# Patient Record
Sex: FEMALE | Race: BLACK OR AFRICAN AMERICAN
[De-identification: names, ages, dates, MRNs, and addresses within clinical notes are randomized per-mention and may not be internally consistent; named-entity substitution may affect disease eponyms.]

---

## 2019-01-11 ENCOUNTER — HOSPITAL ENCOUNTER (EMERGENCY)
Dept: HOSPITAL 72 - EMR | Age: 39
Discharge: HOME | End: 2019-01-11
Payer: COMMERCIAL

## 2019-01-11 VITALS — SYSTOLIC BLOOD PRESSURE: 142 MMHG | DIASTOLIC BLOOD PRESSURE: 77 MMHG

## 2019-01-11 VITALS — HEIGHT: 62 IN | BODY MASS INDEX: 41.41 KG/M2 | WEIGHT: 225 LBS

## 2019-01-11 DIAGNOSIS — M79.10: ICD-10-CM

## 2019-01-11 DIAGNOSIS — J45.901: Primary | ICD-10-CM

## 2019-01-11 PROCEDURE — 94664 DEMO&/EVAL PT USE INHALER: CPT

## 2019-01-11 PROCEDURE — 99283 EMERGENCY DEPT VISIT LOW MDM: CPT

## 2019-01-11 PROCEDURE — 94640 AIRWAY INHALATION TREATMENT: CPT

## 2019-01-11 RX ADMIN — Medication SCH MCG: at 20:03

## 2019-01-11 RX ADMIN — LEVALBUTEROL HYDROCHLORIDE SCH MG: 1.25 SOLUTION, CONCENTRATE RESPIRATORY (INHALATION) at 20:44

## 2019-01-11 RX ADMIN — LEVALBUTEROL HYDROCHLORIDE SCH MG: 1.25 SOLUTION, CONCENTRATE RESPIRATORY (INHALATION) at 21:00

## 2019-01-11 RX ADMIN — Medication SCH MCG: at 20:04

## 2019-01-11 RX ADMIN — LEVALBUTEROL HYDROCHLORIDE SCH MG: 1.25 SOLUTION, CONCENTRATE RESPIRATORY (INHALATION) at 20:15

## 2019-01-11 RX ADMIN — LEVALBUTEROL HYDROCHLORIDE SCH MG: 1.25 SOLUTION, CONCENTRATE RESPIRATORY (INHALATION) at 20:04

## 2019-01-11 RX ADMIN — Medication SCH MCG: at 21:00

## 2019-01-11 RX ADMIN — Medication SCH MCG: at 20:46

## 2019-01-11 RX ADMIN — LEVALBUTEROL HYDROCHLORIDE SCH MG: 1.25 SOLUTION, CONCENTRATE RESPIRATORY (INHALATION) at 19:35

## 2019-01-11 RX ADMIN — LEVALBUTEROL HYDROCHLORIDE SCH MG: 1.25 SOLUTION, CONCENTRATE RESPIRATORY (INHALATION) at 20:03

## 2019-01-11 RX ADMIN — Medication SCH MCG: at 20:43

## 2019-01-11 RX ADMIN — Medication SCH MCG: at 19:35

## 2019-01-11 RX ADMIN — LEVALBUTEROL HYDROCHLORIDE SCH MG: 1.25 SOLUTION, CONCENTRATE RESPIRATORY (INHALATION) at 20:46

## 2019-01-11 RX ADMIN — Medication SCH MCG: at 20:15

## 2019-01-11 RX ADMIN — LEVALBUTEROL HYDROCHLORIDE SCH MG: 1.25 SOLUTION, CONCENTRATE RESPIRATORY (INHALATION) at 20:45

## 2019-01-11 RX ADMIN — Medication SCH MCG: at 20:42

## 2019-01-11 NOTE — EMERGENCY ROOM REPORT
History of Present Illness


General


Chief Complaint:  Asthma


Source:  Patient





Present Illness


HPI


38-year-old female presents ED for evaluation.  Patient presents with cough and 

shortness of breath.  History of asthma cough is dry.  Patient notes body 

aches.  Dull, 5 out of 10, nonradiating.  Denies fevers or chills.  Denies 

chest pain.  Denies sick contacts or recent travel.  States that she has her 

Flovent but not her albuterol inhaler.  No other aggravating relieving factors.

  Denies any other associated symptoms


Allergies:  


Coded Allergies:  


     No Known Allergies (Unverified , 1/11/19)





Patient History


Past Medical History:  asthma


Past Surgical History:  none


Pertinent Family History:  none


Social History:  Denies: smoking, alcohol use, drug use


Last Menstrual Period:  12/01/18


Pregnant Now:  No


Immunizations:  UTD


Reviewed Nursing Documentation:  PMH: Agreed; PSxH: Agreed





Nursing Documentation-PMH


Past Medical History:  No History, Except For


Hx Asthma:  Yes





Review of Systems


All Other Systems:  negative except mentioned in HPI





Physical Exam





Vital Signs








  Date Time  Temp Pulse Resp B/P (MAP) Pulse Ox O2 Delivery O2 Flow Rate FiO2


 


1/11/19 18:53 98.8 136 18 131/88 93 Room Air  


 


1/11/19 19:30        21








Sp02 EP Interpretation:  reviewed, normal


General Appearance:  no apparent distress, alert, GCS 15, non-toxic


Head:  normocephalic


Eyes:  bilateral eye normal inspection, bilateral eye PERRL


ENT:  normal ENT inspection


Neck:  normal inspection


Respiratory:  decreased breath sounds, wheezing


Cardiovascular #1:  no edema, tachycardia


Gastrointestinal:  normal inspection


Rectal:  deferred


Genitourinary:  no CVA tenderness


Musculoskeletal:  normal inspection


Neurologic:  alert, oriented x3, responsive, motor strength/tone normal, 

sensory intact, speech normal


Psychiatric:  normal inspection


Skin:  normal inspection


Lymphatic:  normal inspection





Medical Decision Making


Diagnostic Impression:  


 Primary Impression:  


 Asthma attack


 Qualified Codes:  J45.901 - Unspecified asthma with (acute) exacerbation


ER Course


Hospital Course 


38-year-old female presents to ED complaining of cough, wheezing





Differential diagnoses include: URI, bronchitis, asthma/COPD, pneumonia 





Clinical course


Patient placed on stretcher.  After initial history, physical exam reveals a 

female in no acute distress.  Bilateral TM unremarkable.  No pharyngeal 

erythema.  No tonsillar exudates.  No lymphadenopathy.  Mild wheezing noted on 

exam, no signs of respiratory distress or retractions.  Patient given Prelone 

and Xopenex/Atrovent treatment in ED with symptoms improved.





Initially tachycardic but resolved after treatment.  Reassurance given.  Safe 

for discharge.  We'll prescribe inhaler, steroids.  Patient has a PMD





Diagnosis - asthma exacerbation 





Stable and discharged home with prescriptions for albuterol, prednisone.  

Instructed to followup with PMD.  Return to ED if symptoms recur or worsen





Last Vital Signs








  Date Time  Temp Pulse Resp B/P (MAP) Pulse Ox O2 Delivery O2 Flow Rate FiO2


 


1/11/19 20:55  98 14  100 Room Air  21


 


1/11/19 19:05 98.8   142/77    








Status:  improved


Disposition:  HOME, SELF-CARE


Condition:  Stable


Scripts


Prednisone* (PREDNISONE*) 20 Mg Tablet


40 MG ORAL DAILY, #10 TAB


   Prov: Hawk Salgado MD         1/11/19 


Albuterol Sulfate* (ALBUTEROL SULFATE MDI*) 8.5 Gm Hfa.aer.ad


2 PUFF INH Q6H, #1 EA 0 Refills


   Prov: Hawk Salgado MD         1/11/19


Referrals:  


[facility]ERR[facility] (PCP)


Patient Instructions:  Asthma, Adult











Hawk Salgado MD Jan 11, 2019 21:55
caffeine

## 2019-06-10 ENCOUNTER — HOSPITAL ENCOUNTER (EMERGENCY)
Dept: HOSPITAL 72 - EMR | Age: 39
Discharge: HOME | End: 2019-06-10
Payer: COMMERCIAL

## 2019-06-10 VITALS — HEIGHT: 63 IN | WEIGHT: 222 LBS | BODY MASS INDEX: 39.34 KG/M2

## 2019-06-10 VITALS — SYSTOLIC BLOOD PRESSURE: 132 MMHG | DIASTOLIC BLOOD PRESSURE: 82 MMHG
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
## 2019-06-10 DIAGNOSIS — S93.402A: Primary | ICD-10-CM

## 2019-06-10 PROCEDURE — 99283 EMERGENCY DEPT VISIT LOW MDM: CPT

## 2019-06-10 NOTE — NUR
ER DISCHARGE NOTE:pain meds diven, x-ray done  and ace wrap placed on left 
ankle

Patient is cleared to be discharged per ERMD, pt is aox4, on room air, with 
stable vital signs. pt was given dc and prescription instructions, pt was able 
to verbalize understanding, pt is able to ambulate with steady gait. pt took 
all belongings.

## 2019-06-10 NOTE — DIAGNOSTIC IMAGING REPORT
Indication: left ankle pain

 

Comparison: None

 

Findings:

 

3 views of the left ankle obtained.

 

 

There is soft tissue swelling in the ankle and hindfoot region.  No acute fracture,

malalignment, periostitis, or osteochondral defects are identified. There is a mild

degree of mid foot periarticular spurring due to arthrosis.

 

Impression: Soft tissue swelling

## 2019-06-10 NOTE — EMERGENCY ROOM REPORT
History of Present Illness


General


Chief Complaint:  Lower Extremity Injury


Source:  Medical Record





Present Illness


HPI


38-year-old female presents to the emergency department complaining of 

localized pain, swelling, tenderness and intermittent warmth to the left ankle 

x1 week.  Patient states that walking and standing exacerbates her symptoms she 

reports acute onset after slipping while at the pool 1 week ago.  Patient 

states she is able to bear weight she denies previous injury to the extremity.  

She has been taking Motrin 600 mg without much relief.  Denies fevers, chills, 

rashes, hx of joint swelling/pain, hx or RA or autoimmune disorder, hx of STI, 

numbness tingling or loss of sensation or gross motor movements of the 

extremities, incontinence of bowel or bladder. Denies CP, Palpitations, LOC, AMS

, dizziness, Changes in Vision, weakness or a sudden severe headache.


Allergies:  


Coded Allergies:  


     No Known Allergies (Unverified , 1/11/19)





Patient History


Past Medical History:  see triage record


Past Surgical History:  none


Pertinent Family History:  none


Last Menstrual Period:  05/14/19


Reviewed Nursing Documentation:  PMH: Agreed; PSxH: Agreed





Nursing Documentation-PMH


Past Medical History:  No History, Except For


Hx Asthma:  Yes





Review of Systems


All Other Systems:  negative except mentioned in HPI





Physical Exam





Vital Signs








  Date Time  Temp Pulse Resp B/P (MAP) Pulse Ox O2 Delivery O2 Flow Rate FiO2


 


6/10/19 12:13 98.4 105 18 132/82 (99) 96 Room Air  








Sp02 EP Interpretation:  reviewed, normal


General Appearance:  no apparent distress, alert, GCS 15, non-toxic


Head:  normocephalic, atraumatic


Eyes:  bilateral eye normal inspection, bilateral eye PERRL


ENT:  hearing grossly normal, normal voice


Neck:  full range of motion


Respiratory:  chest non-tender, lungs clear, normal breath sounds, speaking 

full sentences


Cardiovascular #1:  regular rate, rhythm, no edema - left ankle, normal 

capillary refill


Musculoskeletal:  back normal, gait/station normal, normal range of motion - 

compensatory-favoring the left ankle, swelling - left medial and lateral ankle, 

no erythema or warmth. Able to bear weight. FROM , some mild ttp. , other - no 

increased laxity or obvious deformities, no redness. , tender - left ankle


Neurologic:  alert, oriented x3, responsive, motor strength/tone normal, 

sensory intact, speech normal, grossly normal


Psychiatric:  judgement/insight normal


Skin:  normal color, no rash, warm/dry, well hydrated





Medical Decision Making


PA Attestation


Dr. Patel is my supervising Physician whom patient management has been discussed 

with.


Diagnostic Impression:  


 Primary Impression:  


 Ankle pain, left


 Qualified Codes:  M25.572 - Pain in left ankle and joints of left foot


 Additional Impression:  


 Left ankle sprain


 Qualified Codes:  S93.402A - Sprain of unspecified ligament of left ankle, 

initial encounter


ER Course


38-year-old female presents to the emergency department complaining of 

localized pain, swelling, tenderness and intermittent warmth to the left ankle 

x1 week.  Patient states that walking and standing exacerbates her symptoms she 

reports acute onset after slipping while at the pool 1 week ago.  Patient 

states she is able to bear weight she denies previous injury to the extremity.  

She has been taking Motrin 600 mg without much relief.  Denies fevers, chills, 

rashes, hx of joint swelling/pain, hx or RA or autoimmune disorder, hx of STI, 

numbness tingling or loss of sensation or gross motor movements of the 

extremities, incontinence of bowel or bladder. Denies CP, Palpitations, LOC, AMS

, dizziness, Changes in Vision, weakness or a sudden severe headache. 





Ddx considered but are not limited to Fracture, dislocation, contusion,  Sprain/

Strain/Spasm, Septic Joint, RA, arthritis just to name a few  





Vital signs: are WNL, pt. is afebrile


H&PE are most consistent with musculoskeletal injury will perform imaging to r/

o fractures/dislocations. 





ORDERS:





-  X-ray Left ankle 3 views   - negative for fx, Dislocation, or significant 

soft tissue injury, per preliminary read in ED, and signed by  FELISA Rodríguez

, my supervising physician has reviewed, and agrees with my interpretation.





ED INTERVENTIONS:





-  Tylenol 


-Ace wrap applied to the left ankle by ED tech. Pt. remains neurovascularly 

intact.


-Patient is provided with crutches and instructed on their use








DISCHARGE: At this time pt. is stable for d/c to home. Will provide printed 

patient care instructions, and any necessary prescriptions. Care plan and 

follow up instructions have been discussed with the patient prior to discharge.


Other X-Ray Diagnostic Results


Other X-Ray Diagnostic Results :  


   X-Ray ordered:  Left Ankle


   # of Views/Limited Vs Complete:  3 View


   Indication:  Pain


   EP Interpretation:  Yes


   PA Xray:  Interpretation reviewed, by supervising MD, and agrees with 

findings.


   Interpretation:  no dislocation, no soft tissue swelling, no fractures


   Impression:  No acute disease


   Electronically Signed by:  Arcelia Rodríguez PA-C





Last Vital Signs








  Date Time  Temp Pulse Resp B/P (MAP) Pulse Ox O2 Delivery O2 Flow Rate FiO2


 


6/10/19 12:13 98.4 105 18 132/82 (99) 96 Room Air  








Status:  improved


Disposition:  HOME, SELF-CARE


Condition:  Stable


Scripts


Ibuprofen* (MOTRIN*) 600 Mg Tablet


600 MG ORAL THREE TIMES A DAY, #30 TAB 0 Refills


   Prov: Arcelia Rodríguez         6/10/19 


Diclofenac Sodium (VOLTAREN) 100 Gm Gel..gram.


1 APPLIC TP TID, #100 GM


   Prov: Arcelia Rodríguez         6/10/19


Departure Forms:  Return to Work      Return to Work Date:  Jun 13, 2019


   Work Restrictions:  None, No Heavy Lifting, No Prolonged Standing


   Other Restrictions:  May return Sooner if Symptoms have resolved. 


   Return to Full Activity:  Jun 17, 2019


Patient Instructions:  Ankle Sprain





Additional Instructions:  


Take medications as directed. 





 ** Follow up with an ORTHOPEDIC SPECIALIST in 3-5 days, even if your symptoms 

have resolved. ** 





If symptoms persist MRI may be required at the discretion of your PCP or Ortho 

Specialist.  ** 





--Please review list of primary care clinics, if you do not already have a 

primary care provider who can give you an Orthopedic Referral. 





Return sooner to ED if new symptoms occur, or current symptoms become worse. 


 











- Please note that this Emergency Department Report was dictated using [employer] technology software, occasionally this can lead to 

erroneous entry secondary to interpretation by the dictation equipment.











Arcelia Rodríguez Anthony 10, 2019 12:48

## 2020-03-04 ENCOUNTER — HOSPITAL ENCOUNTER (EMERGENCY)
Dept: HOSPITAL 72 - EMR | Age: 40
Discharge: HOME | End: 2020-03-04
Payer: COMMERCIAL

## 2020-03-04 VITALS
WEIGHT: 225 LBS | HEIGHT: 65 IN | SYSTOLIC BLOOD PRESSURE: 136 MMHG | BODY MASS INDEX: 37.49 KG/M2 | DIASTOLIC BLOOD PRESSURE: 100 MMHG

## 2020-03-04 VITALS — DIASTOLIC BLOOD PRESSURE: 100 MMHG | SYSTOLIC BLOOD PRESSURE: 136 MMHG

## 2020-03-04 DIAGNOSIS — J45.909: ICD-10-CM

## 2020-03-04 DIAGNOSIS — F17.200: ICD-10-CM

## 2020-03-04 DIAGNOSIS — L03.115: Primary | ICD-10-CM

## 2020-03-04 PROCEDURE — 99283 EMERGENCY DEPT VISIT LOW MDM: CPT

## 2020-03-04 PROCEDURE — 73610 X-RAY EXAM OF ANKLE: CPT

## 2020-03-04 PROCEDURE — 73630 X-RAY EXAM OF FOOT: CPT

## 2020-03-04 RX ADMIN — KETOROLAC TROMETHAMINE ONE MG: 30 INJECTION, SOLUTION INTRAMUSCULAR; INTRAVENOUS at 09:48

## 2020-03-04 RX ADMIN — KETOROLAC TROMETHAMINE ONE MG: 30 INJECTION, SOLUTION INTRAMUSCULAR; INTRAVENOUS at 09:43

## 2020-03-04 NOTE — NUR
ED Nurse Note:



before dispensing toradol, RN fully explained to the patient regarding that 
it's going to be given IM shot, patient gave verbal consent. however after 
preparing the medication, patient refused to take IM shot. 60 mg of toradol 
wasted with witness in the pyxis.

## 2020-03-04 NOTE — DIAGNOSTIC IMAGING REPORT
Indication: Foot Pain

 

Comparison: None

 

Findings: 3  views of the right foot were obtained. 

 

No acute fractures, malalignment, erosions or periostitis are identified.

 

Impression: No acute findings.

## 2020-03-04 NOTE — EMERGENCY ROOM REPORT
History of Present Illness


General


Chief Complaint:  General Complaint


Source:  Patient, EMS





Present Illness


HPI


Patient is a 39-year-old female past medical history of left ankle cellulitis 

who presents to the ER complaining of right ankle pain.  She states that it 

began several days ago at rest.  She denies any fever or chills.  She complains 

of swelling.  She denies any trauma.  She denies any rash.  She denies any 

chest pain or shortness of breath.  She denies any abdominal pain, nausea or 

vomiting.  Patient was brought in by EMS.  She states it is painful to walk.


Allergies:  


Coded Allergies:  


     No Known Allergies (Unverified , 1/11/19)





Patient History


Past Medical History:  other - cellulitis


Past Surgical History:  none


Social History:  Reports: smoking





Nursing Documentation-[facility]


Past Medical History:  No Stated History


Hx Asthma:  Yes





Review of Systems


All Other Systems:  negative except mentioned in HPI





Physical Exam





Vital Signs








  Date Time  Temp Pulse Resp B/P (MAP) Pulse Ox O2 Delivery O2 Flow Rate FiO2


 


3/4/20 09:21 99.0 102 18 136/100 (112) 98 Room Air  








Sp02 EP Interpretation:  reviewed, normal


General Appearance:  no apparent distress, alert, GCS 15, non-toxic


Head:  normocephalic, atraumatic


Eyes:  bilateral eye normal inspection, bilateral eye PERRL


ENT:  hearing grossly normal, normal pharynx, no angioedema, normal voice


Neck:  full range of motion, supple/symm/no masses


Respiratory:  chest non-tender, lungs clear, normal breath sounds, speaking 

full sentences


Cardiovascular #1:  regular rate, rhythm, no edema


Cardiovascular #2:  2+ carotid (R), 2+ carotid (L), 2+ radial (R), 2+ radial (L)

, 2+ dorsalis pedis (R), 2+ dorsalis pedis (L)


Gastrointestinal:  normal bowel sounds, non tender, soft, non-distended, no 

guarding, no rebound


Rectal:  deferred


Genitourinary:  normal inspection, no CVA tenderness


Musculoskeletal:  back normal, normal range of motion, calf tenderness, gait/

station normal, other - Diffuse right foot and ankle tenderness with swelling, 

warm to touch, no erythema, no crepitus, normal range of motion no calf 

tenderness or swelling


Neurologic:  alert, motor strength/tone normal, oriented x3, sensory intact, 

responsive, speech normal


Psychiatric:  judgement/insight normal, memory normal, mood/affect normal, no 

suicidal/homicidal ideation


Reflexes:  3+ bicep (R), 3+ bicep (L), 3+ tricep (R), 3+ tricep (L), 3+ knee (R)

, 3+ knee (L)


Skin:  no rash


Lymphatic:  no adenopathy





Medical Decision Making


Diagnostic Impression:  


 Primary Impression:  


 Cellulitis


ER Course


Patient's x-rays demonstrate no acute fractures.  Patient started on Bactrim 

and Keflex for her cellulitis.  After discussing risks and benefits of further 

diagnostics, treatment plans, as well as indications for and risks of admission

, the patient is agreeable to being discharged home.  I have explained that 

their evaluation and treatment in the emergency department today is an 

important step towards them achieving better health but that their evaluation 

today is not intended to replace further evaluation and treatment by a 

physician in their local clinic.  I have explained that while the current 

findings suggest no immediate life threatening emergency they will require 

further evaluation and treatment by a physician of their choice in their area.  

They understand that it will be necessary for them to review the final reports 

of their ED visit with their clinic physician.  We have reviewed indications 

for return to the Emergency Department.  I have explained that additional time 

may need to pass and/or additional testing as an outpatient may be necessary 

before a definitive diagnosis can be made.  They tell me they are willing to 

follow up as instructed within the timeframe I recommend.  They appear to 

understand what we discussed.  Additionally they understand that if they are 

unable to be seen by an outpatient physician they are welcome, and in fact 

should, return to the Emergency Department for a repeat evaluation.  The 

patient is stable at time of discharge.





Last Vital Signs








  Date Time  Temp Pulse Resp B/P (MAP) Pulse Ox O2 Delivery O2 Flow Rate FiO2


 


3/4/20 09:33  102 18   Room Air  


 


3/4/20 09:28 99.0   136/100 98   








Disposition:  HOME, SELF-CARE


Condition:  Stable


Scripts


Trimethoprim/Sulfamethoxazole 160/800* (BACTRIM DS TABLET*) 1 Each Tablet


1 TAB ORAL Q12H for 10 Days, #14 TAB 0 Refills


   Prov: Asiya Lawrence M.D.         3/4/20 


Cephalexin* (KEFLEX*) 500 Mg Capsule


500 MG ORAL EVERY 6 HOURS for 10 Days, CAP


   Prov: Asiya Lawrence M.D.         3/4/20











Asiya Lawrence M.D. Mar 4, 2020 09:45

## 2020-03-04 NOTE — DIAGNOSTIC IMAGING REPORT
Indication: Pain right ankle  

 

Comparison: 1/30/2020

 

Findings:

 

3 views of the right ankle obtained.

 

 No acute fracture, malalignment, periostitis, or osteochondral defects are

identified. Osteophytes noted within the tibiotalar joint and plantar calcaneal spur

 

 There is generalized soft tissue swelling present.  .

 

Impression:

 

No acute findings

## 2020-03-04 NOTE — NUR
ER DISCHARGE NOTE:

Patient is cleared to be discharged per ERMD DR LAWRENCE, pt is aox4, on room 
air, with stable vital signs. pt was given dc and prescription instructions, pt 
was able to verbalize understanding, pt id band removed without complications. 
pt is able to ambulate with steady gait. pt took all belongings. boot provided 
to the patient as ordered by Dr. Lawrence by [facility].

## 2020-03-04 NOTE — NUR
ED Nurse Note:



patient brought into ED from home by ambulance  due to right ankle pain 
that got worse since 3/3/20, patient reports possible cellulitis and was seen 
by a doctor and got treatment for this.

## 2020-05-20 ENCOUNTER — HOSPITAL ENCOUNTER (EMERGENCY)
Dept: HOSPITAL 72 - EMR | Age: 40
Discharge: HOME | End: 2020-05-20
Payer: COMMERCIAL

## 2020-05-20 VITALS — WEIGHT: 230 LBS | HEIGHT: 63 IN | BODY MASS INDEX: 40.75 KG/M2

## 2020-05-20 VITALS — DIASTOLIC BLOOD PRESSURE: 83 MMHG | SYSTOLIC BLOOD PRESSURE: 128 MMHG

## 2020-05-20 VITALS — DIASTOLIC BLOOD PRESSURE: 85 MMHG | SYSTOLIC BLOOD PRESSURE: 132 MMHG

## 2020-05-20 DIAGNOSIS — M79.605: Primary | ICD-10-CM

## 2020-05-20 PROCEDURE — 99282 EMERGENCY DEPT VISIT SF MDM: CPT

## 2020-11-06 ENCOUNTER — HOSPITAL ENCOUNTER (EMERGENCY)
Dept: HOSPITAL 72 - EMR | Age: 40
Discharge: HOME | End: 2020-11-06
Payer: COMMERCIAL

## 2020-11-06 VITALS — WEIGHT: 223 LBS | BODY MASS INDEX: 41.04 KG/M2 | HEIGHT: 62 IN

## 2020-11-06 VITALS — SYSTOLIC BLOOD PRESSURE: 123 MMHG | DIASTOLIC BLOOD PRESSURE: 81 MMHG

## 2020-11-06 VITALS — DIASTOLIC BLOOD PRESSURE: 74 MMHG | SYSTOLIC BLOOD PRESSURE: 128 MMHG

## 2020-11-06 DIAGNOSIS — R07.81: ICD-10-CM

## 2020-11-06 DIAGNOSIS — M54.9: Primary | ICD-10-CM

## 2020-11-06 PROCEDURE — 99283 EMERGENCY DEPT VISIT LOW MDM: CPT

## 2020-11-06 PROCEDURE — 71045 X-RAY EXAM CHEST 1 VIEW: CPT

## 2020-11-06 NOTE — NUR
ED Nurse Note:



Patient walked in to ER c/o left ribs pain since yesterday, no injury reported. 
AAO x4, VSS at this time.

## 2020-11-06 NOTE — EMERGENCY ROOM REPORT
History of Present Illness


General


Chief Complaint:  Pain


Source:  Patient





Present Illness


HPI


41 YO female w. hx of asthma presents to the ED c/o 10/10 in severity left sided

rib and back pain Since yesterday. Pt. denies trauma or fall. Fevers or chills. 

She reports coughing exacerbates her pain. She states she does not cough very 

often.  She denies shortness of breath or wheezing. She denies pain with breathi

ng. She denies feeling SOB.   She reports difficulty sleeping as she is unable 

to lay on that side.  Patient denies rashes.  She states she is not sure if she 

ever had the chickenpox when she was younger.  She does report that her father 

has had shingles.  Patient denies feeling fatigue, sore throat or recent 

illness.  She denies increase in her asthma. Denies palpitations, smoking, or 

recent travel.  No other aggravating or relieving factors at this time. She 

reports taking ibu and using OTC patches with no relief.


Allergies:  


Coded Allergies:  


     No Known Allergies (Unverified , 1/11/19)





COVID-19 Screening


Contact w/high risk pt:  No


Recent Travel to affected area:  No


Experienced COVID-19 symptoms?:  No


COVID-19 Testing performed PTA:  No





Patient History


Past Medical History:  see triage record


Past Surgical History:  none


Pertinent Family History:  none


Pregnant Now:  No


Reviewed Nursing Documentation:  PMH: Agreed; PSxH: Agreed





Nursing Documentation-PMH


Past Medical History:  No History, Except For


Hx Asthma:  Yes





Review of Systems


All Other Systems:  negative except mentioned in HPI





Physical Exam





Vital Signs








  Date Time  Temp Pulse Resp B/P (MAP) Pulse Ox O2 Delivery O2 Flow Rate FiO2


 


11/6/20 18:24 97.5 97 18 123/81 (95) 98 Room Air  








Sp02 EP Interpretation:  reviewed, normal


General Appearance:  no apparent distress, alert, GCS 15, non-toxic


Head:  normocephalic, atraumatic


Eyes:  bilateral eye normal inspection, bilateral eye PERRL


ENT:  hearing grossly normal, normal voice


Neck:  full range of motion


Respiratory:  chest non-tender, lungs clear, normal breath sounds, no 

respiratory distress, no accessory muscle use, no wheezing, speaking full 

sentences, other -  Patient has moderate palpable tenderness in the soft tissues

as well as muscle of the lateral aspect of the back on the left side is well as 

rib area.  No visible rash, erythema or warmth.


Cardiovascular #1:  regular rate, rhythm, no edema, normal capillary refill


Gastrointestinal:  normal bowel sounds, non tender, soft, non-distended, no 

guarding


Rectal:  deferred


Genitourinary:  normal inspection, no CVA tenderness


Musculoskeletal:  back normal, normal range of motion, gait/station normal, non-

tender


Neurologic:  alert, motor strength/tone normal, oriented x3, sensory intact, 

responsive, speech normal


Psychiatric:  judgement/insight normal


Skin:  no rash


Lymphatic:  no adenopathy





Medical Decision Making


PA Attestation


Dr. Tyson  is my supervising Physician whom patient management has been 

discussed with.


Diagnostic Impression:  


   Primary Impression:  


   Back pain


   Qualified Codes:  M54.9 - Dorsalgia, unspecified


   Additional Impressions:  


   Rib pain on left side


   Rib tenderness


ER Course


41 YO female w. hx of asthma presents to the ED c/o 10/10 in severity left sided

rib and back pain Since yesterday. Pt. denies trauma or fall. Fevers or chills. 

She reports coughing exacerbates her pain. She states she does not cough very 

often.  She denies shortness of breath or wheezing. She denies pain with 

breathing. She denies feeling SOB.   She reports difficulty sleeping as she is 

unable to lay on that side.  Patient denies rashes.  She states she is not sure 

if she ever had the chickenpox when she was younger.  She does report that her 

father has had shingles.  Patient denies feeling fatigue, sore throat or recent 

illness.  She denies increase in her asthma. Denies palpitations, smoking, or 

recent travel.  No other aggravating or relieving factors at this time. She 

reports taking ibu and using OTC patches with no relief.  








Ddx considered but are not limited to contusion, shingles, cellulitis, 

pneumonia,URI,  rib fracture, pneumonitis, asthma exacerbation, cardiac cause 

just to name a few


Vital signs: are WNL, pt. is afebrile


H&PE are most consistent with soft tissue injury.  Patient has moderate palpable

tenderness in the soft tissues as well as muscle of the lateral aspect of the 

back on the left side is well as rib area.  No visible rash, erythema or warmth.

 Pt. without cardiac RF.





ORDERS: 


-CXR: low lung volume. 





ED INTERVENTIONS:





-Robaxin 1g PO





-I do not identify an emergent condition at this time. With current pres

entation,  pt. is stable for close outpatient follow up and conservative 

treatment.  D/w pt. to return promptly to ED with worsening or new symptoms.- 

Pt. verbalizes' understanding and agreement with proposed treatment plan.





DISCHARGE: At this time pt. is stable for d/c to home. Will provide printed 

patient care instructions, and any necessary prescriptions. Care plan and follow

up instructions have been discussed with the patient prior to discharge.


Chest X-Ray Diagnostic Results


Chest X-Ray Diagnostic Results :  


   Chest X-Ray Ordered:  Yes


   # of Views/Limited/Complete:  1 View


   Indication:  Chest Pain


   EP Interpretation:  Yes


   PA Xray:  Interpretation reviewed, by supervising MD, and agrees with 

findings.


   Interpretation:  no consolidation, no effusion, no pneumothorax, no acute 

cardiopulmonary disease, other - some mild interstitial infiltrates


   Impression:  Other


   Electronically Signed by:  Arcelia Rodríguez PA-C





Last Vital Signs








  Date Time  Temp Pulse Resp B/P (MAP) Pulse Ox O2 Delivery O2 Flow Rate FiO2


 


11/6/20 18:47 97.5  18 123/81 98 Room Air  


 


11/6/20 18:24  97      








Status:  improved


Disposition:  HOME, SELF-CARE


Condition:  Stable


Patient Instructions:  Muscle Pain, Adult, Nonspecific Chest Pain, Easy-to-Read





Additional Instructions:  


Take medications as directed. Do not drink alcohol, drive, or operate heavy 

machinery while taking Robaxin ( Muscle Relaxer/pain medication)  as this may 

cause drowsiness. 








 ** Follow up with a Primary Care Provider in 3-5 days, even if your symptoms 

have resolved. ** 


--Please review list of primary care clinics, if you do not already have a 

primary care provider





Return sooner to ED if new symptoms occur, or current symptoms become worse. 


Do not drink alcohol, drive, or operate heavy machinery while taking Robaxin ( 

Muscle Relaxer/pain medication)  as this may cause drowsiness. 











- Please note that this Emergency Department Report was dictated using [employer] technology software, occasionally this can lead to 

erroneous entry secondary to interpretation by the dictation equipment.











Arcelia Rodríguez               Nov 6, 2020 19:40

## 2020-11-06 NOTE — NUR
ED Nurse Note:



Pt cleared by ERPA for discharge.  DC instructions was given and explained to 
pt and verbalized understanding of teachings. prescription sent to the pharmacy 
of choice. All medical deviecs such as ID band  removed. Pt is AAO x4, 
ambulatory and left with all personal belongings. P/u by family member.

## 2020-11-06 NOTE — DIAGNOSTIC IMAGING REPORT
EXAM:

  XR Chest, 1 View

 

CLINICAL HISTORY:

  PAIN

 

TECHNIQUE:

  Frontal view of the chest.

 

COMPARISON:

  No relevant prior studies available.

 

FINDINGS:

  Lungs:  Question airspace opacities within the lower lungs which may be 

inflammatory or infectious.  Findings are limited secondary to overlying 

soft tissue densities.

  Pleural space:  Unremarkable.

  Heart:  Unremarkable.

  Mediastinum:  Unremarkable.

  Bones/joints:  Unremarkable.

 

IMPRESSION:     

  Question airspace opacities within the lower lungs which may be 

inflammatory or infectious.  Findings are limited secondary to overlying 

soft tissue densities.